# Patient Record
Sex: FEMALE | URBAN - METROPOLITAN AREA
[De-identification: names, ages, dates, MRNs, and addresses within clinical notes are randomized per-mention and may not be internally consistent; named-entity substitution may affect disease eponyms.]

---

## 2021-03-22 ENCOUNTER — OFFICE VISIT (OUTPATIENT)
Dept: URBAN - METROPOLITAN AREA CLINIC 88 | Facility: CLINIC | Age: 66
End: 2021-03-22
Payer: MEDICARE

## 2021-03-22 DIAGNOSIS — H43.813 BILATERAL VITREOUS DETACHMENT OF EYES: Primary | Chronic | ICD-10-CM

## 2021-03-22 DIAGNOSIS — H35.343 BILATERAL MACULAR HOLES OF EYES: Chronic | ICD-10-CM

## 2021-03-22 DIAGNOSIS — H25.13 AGE-RELATED NUCLEAR CATARACT, BILATERAL: ICD-10-CM

## 2021-03-22 DIAGNOSIS — H35.413 LATTICE DEGENERATION OF RETINA, BILATERAL: Chronic | ICD-10-CM

## 2021-03-22 DIAGNOSIS — H35.033 HYPERTENSIVE RETINOPATHY, BILATERAL: Chronic | ICD-10-CM

## 2021-03-22 PROCEDURE — 99204 OFFICE O/P NEW MOD 45 MIN: CPT | Performed by: OPHTHALMOLOGY

## 2021-03-22 ASSESSMENT — INTRAOCULAR PRESSURE
OD: 12
OS: 14

## 2021-03-22 NOTE — IMPRESSION/PLAN
Impression: Hypertensive retinopathy, bilateral: H35.033. Plan: Discussed status in detail with patient. Ed pt on importance of good blood pressure control. Will continue to monitor.

## 2021-03-22 NOTE — IMPRESSION/PLAN
Impression: Lattice degeneration of retina, bilateral: H35.413. Plan: Discussed status in detail with patient. Retinal detachment signs and symptoms discussed in detail with patient. RTC immediately if any symptoms experienced.

## 2021-03-22 NOTE — IMPRESSION/PLAN
Impression: Bilateral macular holes of eyes: H35.343. Plan: Hx of Macular holes OU with Cryopexy and laser treatment, appears stable. No holes seen on today's physical exam. Recommend further work-up with OCT Macula and Funuds photos.

## 2021-04-05 ENCOUNTER — OFFICE VISIT (OUTPATIENT)
Dept: URBAN - METROPOLITAN AREA CLINIC 88 | Facility: CLINIC | Age: 66
End: 2021-04-05
Payer: MEDICARE

## 2021-04-05 DIAGNOSIS — H35.371 PUCKERING OF MACULA, RIGHT EYE: Chronic | ICD-10-CM

## 2021-04-05 PROCEDURE — 99213 OFFICE O/P EST LOW 20 MIN: CPT | Performed by: OPHTHALMOLOGY

## 2021-04-05 PROCEDURE — 92134 CPTRZ OPH DX IMG PST SGM RTA: CPT | Performed by: OPHTHALMOLOGY

## 2021-04-05 PROCEDURE — 92250 FUNDUS PHOTOGRAPHY W/I&R: CPT | Performed by: OPHTHALMOLOGY

## 2021-04-05 ASSESSMENT — INTRAOCULAR PRESSURE
OS: 15
OD: 14

## 2021-04-05 NOTE — IMPRESSION/PLAN
Impression: Bilateral macular holes of eyes: H35.343. Plan: Hx of Macular holes OU with Cryopexy and laser treatment, stable apperance. No holes seen on today's OCT Macula. Will monitor in 1 year with Dilate Exam and OCT Macula.

## 2021-04-05 NOTE — IMPRESSION/PLAN
Impression: Puckering of macula, right eye: H35.371. Plan: Discussed status in detail with patient. ERM noted on OCT Macula today. Will continue to monitor.